# Patient Record
Sex: MALE | Race: WHITE | Employment: UNEMPLOYED | URBAN - NONMETROPOLITAN AREA
[De-identification: names, ages, dates, MRNs, and addresses within clinical notes are randomized per-mention and may not be internally consistent; named-entity substitution may affect disease eponyms.]

---

## 2021-12-08 ENCOUNTER — HOSPITAL ENCOUNTER (EMERGENCY)
Age: 36
Discharge: HOME | End: 2021-12-08
Attending: EMERGENCY MEDICINE
Payer: COMMERCIAL

## 2021-12-08 VITALS
SYSTOLIC BLOOD PRESSURE: 161 MMHG | DIASTOLIC BLOOD PRESSURE: 99 MMHG | OXYGEN SATURATION: 100 % | HEIGHT: 72 IN | HEART RATE: 84 BPM | WEIGHT: 165 LBS | BODY MASS INDEX: 22.35 KG/M2 | RESPIRATION RATE: 16 BRPM | TEMPERATURE: 98.7 F

## 2021-12-08 DIAGNOSIS — F19.20 DRUG ABUSE AND DEPENDENCE (HCC): Primary | ICD-10-CM

## 2021-12-08 PROCEDURE — 6360000002 HC RX W HCPCS: Performed by: EMERGENCY MEDICINE

## 2021-12-08 PROCEDURE — 99284 EMERGENCY DEPT VISIT MOD MDM: CPT

## 2021-12-08 PROCEDURE — 96372 THER/PROPH/DIAG INJ SC/IM: CPT

## 2021-12-08 RX ORDER — BUPROPION HYDROCHLORIDE 100 MG/1
100 TABLET ORAL 3 TIMES DAILY
COMMUNITY

## 2021-12-08 RX ORDER — KETOROLAC TROMETHAMINE 30 MG/ML
30 INJECTION, SOLUTION INTRAMUSCULAR; INTRAVENOUS ONCE
Status: COMPLETED | OUTPATIENT
Start: 2021-12-08 | End: 2021-12-08

## 2021-12-08 RX ADMIN — KETOROLAC TROMETHAMINE 30 MG: 30 INJECTION, SOLUTION INTRAMUSCULAR; INTRAVENOUS at 22:07

## 2021-12-08 ASSESSMENT — PAIN SCALES - GENERAL: PAINLEVEL_OUTOF10: 8

## 2021-12-09 NOTE — ED TRIAGE NOTES
Pt arrived via EMS, found laying in cold wet grass. Pt used meth yesterday and snorted his own medications this morning. Pt is a resident at the [facility] currently for drug use. Pt states he thinks the meth had something else in it and he does not feel right, dizzy and nauseated.

## 2021-12-09 NOTE — ED PROVIDER NOTES
Emergency Department Attending Note    Jeromy Hutchinson MD    Date of ED VIsit: 12/8/2021    CHIEF COMPLAINT  Addiction Problem      HISTORY OF PRESENT ILLNESS  Teo Gomez is a 31 y.o. male  With Vital signs of BP (!) 161/99   Pulse 84   Temp 98.7 °F (37.1 °C) (Oral)   Resp 16   Ht 6' (1.829 m)   Wt 165 lb (74.8 kg)   SpO2 100%   BMI 22.38 kg/m²  who presents to the ED with a complaint of doing drugs. Patient seen and evaluated in room 6. Patient states that he snorted some of his illicit drugs this morning and since then has been felt a little weird. He states that it was meth laced with something he is not sure why. But it sounds somewhat like hallucinogen since he is saying that it feels weird with out of body like experiences. Today he was apparently at the [facility] left there and was laying out in the middle of the field so somebody called and the ambulance and the police to take care of the patient who was then brought here for evaluation. He is not hypothermic with a core body temperature of 98.7. He is definitely high on something. But is capable of handling himself capable of walking. He is got no specific areas of pain except for some mild right rib pain. Not sure what it is from. She will be given some Toradol for that. In the area for he will be discharged to a follow-up with his primary care doctor or drug rehab center to try to straighten out. And a long conversation with him regarding the abuse of illicit drugs and how they are uncontrolled. .  No other complaints, modifying factors or associated symptoms. Patients Past medical history reviewed and listed below  Past Medical History:   Diagnosis Date    Hepatitis C     PTSD (post-traumatic stress disorder)      Past Surgical History:   Procedure Laterality Date    APPENDECTOMY      MANDIBLE SURGERY         I have reviewed the following from the nursing documentation. History reviewed. No pertinent family history.   Social History Socioeconomic History    Marital status: Not on file     Spouse name: Not on file    Number of children: Not on file    Years of education: Not on file    Highest education level: Not on file   Occupational History    Not on file   Tobacco Use    Smoking status: Current Every Day Smoker    Smokeless tobacco: Never Used   Vaping Use    Vaping Use: Every day   Substance and Sexual Activity    Alcohol use: Not Currently    Drug use: Yes     Types: Methamphetamines (Crystal Meth), Marijuana (Vergil Maricel), Opiates     Sexual activity: Not Currently   Other Topics Concern    Not on file   Social History Narrative    Not on file     Social Determinants of Health     Financial Resource Strain:     Difficulty of Paying Living Expenses: Not on file   Food Insecurity:     Worried About Running Out of Food in the Last Year: Not on file    Katia of Food in the Last Year: Not on file   Transportation Needs:     Lack of Transportation (Medical): Not on file    Lack of Transportation (Non-Medical):  Not on file   Physical Activity:     Days of Exercise per Week: Not on file    Minutes of Exercise per Session: Not on file   Stress:     Feeling of Stress : Not on file   Social Connections:     Frequency of Communication with Friends and Family: Not on file    Frequency of Social Gatherings with Friends and Family: Not on file    Attends Buddhist Services: Not on file    Active Member of [address] [place] [address] or Organizations: Not on file    Attends Club or Organization Meetings: Not on file    Marital Status: Not on file   Intimate Partner Violence:     Fear of Current or Ex-Partner: Not on file    Emotionally Abused: Not on file    Physically Abused: Not on file    Sexually Abused: Not on file   Housing Stability:     Unable to Pay for Housing in the Last Year: Not on file    Number of [place] in the Last Year: Not on file    Unstable Housing in the Last Year: Not on file     No current facility-administered medications for this encounter. Current Outpatient Medications   Medication Sig Dispense Refill    buPROPion (WELLBUTRIN) 100 MG tablet Take 100 mg by mouth 2 times daily      FLUOXETINE HCL PO Take by mouth       Allergies   Allergen Reactions    Sulfa Antibiotics Nausea And Vomiting       REVIEW OF SYSTEMS  10 systems reviewed, pertinent positives per HPI otherwise noted to be negative     PHYSICAL EXAM  BP (!) 161/99   Pulse 84   Temp 98.7 °F (37.1 °C) (Oral)   Resp 16   Ht 6' (1.829 m)   Wt 165 lb (74.8 kg)   SpO2 100%   BMI 22.38 kg/m²   GENERAL APPEARANCE: Awake and alert. Cooperative. In no obvious distress. HEAD: Normocephalic. Atraumatic. EYES: PERRL. EOM's grossly intact. Mildly dilated  ENT: Mucous membranes are pink and moist.   NECK: Supple. HEART: RRR. No murmurs. LUNGS: Respirations unlabored. CTAB. Good air exchange. ABDOMEN: Soft. Non-distended. Non-tender. No masses. No organomegaly. No guarding or rebound. EXTREMITIES: No peripheral edema. Moves all extremities equally. All extremities neurovascularly intact. SKIN: Warm and dry. No acute rashes. NEUROLOGICAL: Alert and oriented. Strength 5/5, sensation intact. Gait normal.   PSYCHIATRIC: Normal mood and affect. No HI or SI expressed to me. RADIOLOGY    If acquired see below     EKG:     If acquired see below       ED COURSE/MDM    Patient appears to be high but definitely manageable. Not sure what he abuses he states that he abused meth that he thinks was laced with something else he was requesting that we test to see what it was and I advised him that we do not necessarily do that just to find out what it was. Whenever he took should wear off within the next 6 to 7 hours. That is based on his report that he took the drugs this morning         The ED course and plan were reviewed and results discussed with the patient    The patient understood and agreed with the Discharge/transfer planning.     CLINICAL IMPRESSION and DISPOSITION    Yadira Sanchez was stable and diagnosed with drug abuse    Patient was treated with Torcynthia Marie MD  12/08/21 7665

## 2022-02-18 ENCOUNTER — HOSPITAL ENCOUNTER (EMERGENCY)
Age: 37
Discharge: HOME | End: 2022-02-18
Attending: EMERGENCY MEDICINE
Payer: MEDICARE

## 2022-02-18 VITALS
WEIGHT: 160 LBS | SYSTOLIC BLOOD PRESSURE: 114 MMHG | TEMPERATURE: 98.5 F | RESPIRATION RATE: 16 BRPM | HEIGHT: 72 IN | DIASTOLIC BLOOD PRESSURE: 70 MMHG | OXYGEN SATURATION: 98 % | HEART RATE: 60 BPM | BODY MASS INDEX: 21.67 KG/M2

## 2022-02-18 DIAGNOSIS — F32.A DEPRESSION, UNSPECIFIED DEPRESSION TYPE: Primary | ICD-10-CM

## 2022-02-18 DIAGNOSIS — F15.10 METHAMPHETAMINE ABUSE (HCC): ICD-10-CM

## 2022-02-18 LAB
ACETAMINOPHEN LEVEL: <5 UG/ML (ref 10–30)
AMPHETAMINE SCREEN, URINE: NORMAL
ANION GAP SERPL CALCULATED.3IONS-SCNC: 8 MMOL/L (ref 3–16)
BARBITURATE SCREEN URINE: NORMAL
BASOPHILS ABSOLUTE: 0 K/UL (ref 0–0.2)
BASOPHILS RELATIVE PERCENT: 0.3 %
BENZODIAZEPINE SCREEN, URINE: NORMAL
BUN BLDV-MCNC: 13 MG/DL (ref 7–20)
CALCIUM SERPL-MCNC: 9.5 MG/DL (ref 8.3–10.6)
CANNABINOID SCREEN URINE: NORMAL
CHLORIDE BLD-SCNC: 101 MMOL/L (ref 99–110)
CO2: 32 MMOL/L (ref 21–32)
COCAINE METABOLITE SCREEN URINE: NORMAL
CREAT SERPL-MCNC: 0.8 MG/DL (ref 0.9–1.3)
EOSINOPHILS ABSOLUTE: 0.2 K/UL (ref 0–0.6)
EOSINOPHILS RELATIVE PERCENT: 3.8 %
ETHANOL: NORMAL MG/DL (ref 0–0.08)
GFR AFRICAN AMERICAN: >60
GFR NON-AFRICAN AMERICAN: >60
GLUCOSE BLD-MCNC: 84 MG/DL (ref 70–99)
HCT VFR BLD CALC: 44.8 % (ref 40.5–52.5)
HEMOGLOBIN: 15.2 G/DL (ref 13.5–17.5)
LYMPHOCYTES ABSOLUTE: 1.7 K/UL (ref 1–5.1)
LYMPHOCYTES RELATIVE PERCENT: 27.6 %
Lab: NORMAL
MCH RBC QN AUTO: 31.4 PG (ref 26–34)
MCHC RBC AUTO-ENTMCNC: 33.9 G/DL (ref 31–36)
MCV RBC AUTO: 92.5 FL (ref 80–100)
METHADONE SCREEN, URINE: NORMAL
MONOCYTES ABSOLUTE: 0.6 K/UL (ref 0–1.3)
MONOCYTES RELATIVE PERCENT: 9.6 %
NEUTROPHILS ABSOLUTE: 3.7 K/UL (ref 1.7–7.7)
NEUTROPHILS RELATIVE PERCENT: 58.7 %
OPIATE SCREEN URINE: NORMAL
OXYCODONE URINE: NORMAL
PDW BLD-RTO: 13.4 % (ref 12.4–15.4)
PH UA: 7
PHENCYCLIDINE SCREEN URINE: NORMAL
PLATELET # BLD: 224 K/UL (ref 135–450)
PMV BLD AUTO: 9.1 FL (ref 5–10.5)
POTASSIUM REFLEX MAGNESIUM: 3.9 MMOL/L (ref 3.5–5.1)
PROPOXYPHENE SCREEN: NORMAL
RBC # BLD: 4.84 M/UL (ref 4.2–5.9)
SALICYLATE, SERUM: <0.3 MG/DL (ref 15–30)
SODIUM BLD-SCNC: 141 MMOL/L (ref 136–145)
WBC # BLD: 6.3 K/UL (ref 4–11)

## 2022-02-18 PROCEDURE — 80307 DRUG TEST PRSMV CHEM ANLYZR: CPT

## 2022-02-18 PROCEDURE — 85025 COMPLETE CBC W/AUTO DIFF WBC: CPT

## 2022-02-18 PROCEDURE — 80048 BASIC METABOLIC PNL TOTAL CA: CPT

## 2022-02-18 PROCEDURE — 80143 DRUG ASSAY ACETAMINOPHEN: CPT

## 2022-02-18 PROCEDURE — 80179 DRUG ASSAY SALICYLATE: CPT

## 2022-02-18 PROCEDURE — 36415 COLL VENOUS BLD VENIPUNCTURE: CPT

## 2022-02-18 PROCEDURE — 99283 EMERGENCY DEPT VISIT LOW MDM: CPT

## 2022-02-18 PROCEDURE — 82077 ASSAY SPEC XCP UR&BREATH IA: CPT

## 2022-02-18 RX ORDER — HYDROXYZINE HYDROCHLORIDE 25 MG/1
25 TABLET, FILM COATED ORAL EVERY 8 HOURS PRN
COMMUNITY

## 2022-02-18 NOTE — ED NOTES
Patient brought back from triage. Patient changed into safety clothing and belongings locked into locker. Patient oriented to [facility]. Will continue to monitor patient.       John Encarnacion RN  02/18/22 0172

## 2022-02-18 NOTE — ED NOTES
Pt has been explained d/c instructions and verbalized understanding. Pt escorted out of [facility], by Regla Richmond RN, to wait in lobby for ride.      YANCY Ruano  02/18/22 0564

## 2022-02-18 NOTE — ED NOTES
Level of Care Disposition: Discharge     Patient was seen by ED provider and [facility] staff. The case was presented to psychiatric provider on-call .   Based on the ED evaluation and information presented to the provider by [facility] staff, it is the recommendation of the on call psychiatric provider that the patient be discharged from a psychiatric standpoint with the following referrals: follow up with the [place], [place]  02/18/22 4510

## 2022-02-18 NOTE — ED NOTES
Pt states that he reported to the [facility] that he was having thoughts of hurting himself. He denies a plan or a method and has had no previous attempts. He does admit to cutting many years ago. He states that he came to the hospital today so he could be prescribed Wellbutrin. Apparently, this is the only anti-depressant that works for him and the [facility] does not prescribe it. He told this writer that the nurse practitioner at the [facility] prescribed him Lexapro and he is open to trying that. This is not the first time the pt has been to the [facility]. He was there one other time before but left, violating his parole, causing him to go back to [facility] for a month. He reports that he has been to rehab centers before but the programs were not affective for him because he did not want to get clean at the time. He states that the plan after graduation from the [facility] is to stay with a friend for awhile and avoid the people who he used to associate with. Pt is agreeable to be d/c back to the [facility].       YANCY Gonzalez  02/18/22 1386

## 2022-02-18 NOTE — ED NOTES
Collateral Contact:  Name: Linette Dixon  Phone: [phone]  Relation to Patient: employee at [employer] with Patient: Today    Linette Dixon states that the pt has been with the [facility] since 2/11/22. Apparently, the pt disclosed to his main counselor that he has been having thoughts of wanting to hurt himself. Linette Dixon did not report any plan or method. According to the records they have on file the pt has had psychiatric admissions in the past. Pt was at [facility] a month ago. Since he has been at the [facility] 38 reports that the pt has been been \"skitish\" and \"isolative\".       Yolis Cortes, W  02/18/22 6322

## 2022-02-18 NOTE — ED PROVIDER NOTES
CHIEF COMPLAINT  Psychiatric Evaluation (having negative thoughts. pt states he has been off his medication wellbutrin since the 11 th d/t being in the [facility] program. Pt deneis any drug or etoh use since the 10 th)      HISTORY OF PRESENT ILLNESS  Cyndee Campbell is a 31 y.o. male with a history of PTSD, substance use who presents emerged department for evaluation of psychiatric evaluation. Patient states that he has been in the [facility] program. He states that he has recently used methamphetamine. He denies any alcohol or drug use since February 10. He states over the past several days he has had negative thoughts, depression. He states that he does have fleeting thoughts of self-harm however he does not have any plan of how to do this. He states that he used to be on Wellbutrin however since he is not able to access any medicines at his facility currently, he has been off of this. He denies any homicidal ideation. He denies any medical complaints at this time. Past Medical History:   Diagnosis Date    Hepatitis C     PTSD (post-traumatic stress disorder)      Past Surgical History:   Procedure Laterality Date    APPENDECTOMY      MANDIBLE SURGERY       History reviewed. No pertinent family history.   Social History     Socioeconomic History    Marital status: Single     Spouse name: Not on file    Number of children: Not on file    Years of education: Not on file    Highest education level: Not on file   Occupational History    Not on file   Tobacco Use    Smoking status: Current Every Day Smoker    Smokeless tobacco: Never Used   Vaping Use    Vaping Use: Every day   Substance and Sexual Activity    Alcohol use: Not Currently    Drug use: Yes     Types: Methamphetamines (Crystal Meth), Marijuana (Cindy Marin), Opiates     Sexual activity: Not Currently   Other Topics Concern    Not on file   Social History Narrative    Not on file     Social Determinants of Health     Financial Resource Strain:     Atraumatic. [place] HEART: RRR. No harsh murmurs. Intact radial pulses 2+ bilaterally. LUNGS: Respirations unlabored without accessory muscle use. Speaking comfortably in full sentences. ABDOMEN: Soft. Non-distended. Non-tender. No guarding or rebound. EXTREMITIES: No peripheral edema. No acute deformities. SKIN: Warm and dry. No acute rashes. NEUROLOGICAL: Alert and oriented X 3. No focal deficits    LABS  I have reviewed all labs for this visit.    Results for orders placed or performed during the hospital encounter of 02/18/22   CBC with Auto Differential   Result Value Ref Range    WBC 6.3 4.0 - 11.0 K/uL    RBC 4.84 4.20 - 5.90 M/uL    Hemoglobin 15.2 13.5 - 17.5 g/dL    Hematocrit 44.8 40.5 - 52.5 %    MCV 92.5 80.0 - 100.0 fL    MCH 31.4 26.0 - 34.0 pg    MCHC 33.9 31.0 - 36.0 g/dL    RDW 13.4 12.4 - 15.4 %    Platelets 167 950 - 403 K/uL    MPV 9.1 5.0 - 10.5 fL    Neutrophils % 58.7 %    Lymphocytes % 27.6 %    Monocytes % 9.6 %    Eosinophils % 3.8 %    Basophils % 0.3 %    Neutrophils Absolute 3.7 1.7 - 7.7 K/uL    Lymphocytes Absolute 1.7 1.0 - 5.1 K/uL    Monocytes Absolute 0.6 0.0 - 1.3 K/uL    Eosinophils Absolute 0.2 0.0 - 0.6 K/uL    Basophils Absolute 0.0 0.0 - 0.2 K/uL   Basic Metabolic Panel w/ Reflex to MG   Result Value Ref Range    Sodium 141 136 - 145 mmol/L    Potassium reflex Magnesium 3.9 3.5 - 5.1 mmol/L    Chloride 101 99 - 110 mmol/L    CO2 32 21 - 32 mmol/L    Anion Gap 8 3 - 16    Glucose 84 70 - 99 mg/dL    BUN 13 7 - 20 mg/dL    CREATININE 0.8 (L) 0.9 - 1.3 mg/dL    GFR Non-African American >60 >60    GFR African American >60 >60    Calcium 9.5 8.3 - 10.6 mg/dL   Acetaminophen Level   Result Value Ref Range    Acetaminophen Level <5 (L) 10 - 30 ug/mL   Salicylate   Result Value Ref Range    Salicylate, Serum <0.0 (L) 15.0 - 30.0 mg/dL   Drug screen multi urine   Result Value Ref Range    Amphetamine Screen, Urine Neg Negative <1000ng/mL    Barbiturate Screen, Ur Neg Negative <200 ng/mL    Benzodiazepine Screen, Urine Neg Negative <200 ng/mL    Cannabinoid Scrn, Ur Neg Negative <50 ng/mL    Cocaine Metabolite Screen, Urine Neg Negative <300 ng/mL    Opiate Scrn, Ur Neg Negative <300 ng/mL    PCP Screen, Urine Neg Negative <25 ng/mL    Methadone Screen, Urine Neg Negative <300 ng/mL    Propoxyphene Scrn, Ur Neg Negative <300 ng/mL    Oxycodone Urine Neg Negative <100 ng/ml    pH, UA 7.0     Drug Screen Comment: see below    Ethanol   Result Value Ref Range    Ethanol Lvl None Detected mg/dL         RADIOLOGY  X-RAYS:  I have reviewed radiologic plain film image(s). ALL OTHER NON-PLAIN FILM IMAGES SUCH AS CT, ULTRASOUND AND MRI HAVE BEEN READ BY THE RADIOLOGIST. No orders to display          ED COURSE/MDM  Patient seen and evaluated. Old records reviewed. Labs and imaging reviewed and results discussed with patient. 44-year-old male presenting for psychiatric evaluation, depression. He has no overt suicidal ideation or plan at this time. He would like a medication adjustment. He has no medical complaints. He arrives with stable vital signs. Evaluation included laboratory evaluation, toxicology screen, psychiatric evaluation. Laboratory evaluation was unremarkable. Patient was evaluated by psychiatric staff. He was cleared by psychiatric staff to go home with outpatient follow-up. The patient will be discharged from the emergency department. The patient was counseled on their diagnosis and any medications prescribed. They were advised on the need for PCP followup. They were counseled on the need to return to the emergency department if any of their symptoms were to worsen, change or have any other concerns. Discharged in stable condition. Patient was given scripts for the following medications. I counseled patient how to take these medications. Discharge Medication List as of 2/18/2022  5:30 PM          CLINICAL IMPRESSION  1. Depression, unspecified depression type    2. Methamphetamine abuse (HCC)        Blood pressure 114/70, pulse 60, temperature 98.5 °F (36.9 °C), temperature source Temporal, resp. rate 16, height 6' (1.829 m), weight 160 lb (72.6 kg), SpO2 98 %. DISPOSITION  Seth Ball was discharged to home in stable condition. This chart was generated in part by using Dragon Dictation system and may contain errors related to that system including errors in grammar, punctuation, and spelling, as well as words and phrases that may be inappropriate. If there are any questions or concerns please feel free to contact the dictating provider for clarification.      Pascale Lara MD  02/21/22 2489

## 2025-03-21 ENCOUNTER — HOSPITAL ENCOUNTER (EMERGENCY)
Age: 40
Discharge: HOME | End: 2025-03-21
Attending: STUDENT IN AN ORGANIZED HEALTH CARE EDUCATION/TRAINING PROGRAM
Payer: MEDICARE

## 2025-03-21 VITALS
TEMPERATURE: 98.1 F | RESPIRATION RATE: 15 BRPM | DIASTOLIC BLOOD PRESSURE: 72 MMHG | HEART RATE: 69 BPM | SYSTOLIC BLOOD PRESSURE: 124 MMHG | OXYGEN SATURATION: 97 %

## 2025-03-21 DIAGNOSIS — T40.601A OPIATE OVERDOSE, ACCIDENTAL OR UNINTENTIONAL, INITIAL ENCOUNTER (HCC): Primary | ICD-10-CM

## 2025-03-21 LAB — GLUCOSE BLD-MCNC: 87 MG/DL (ref 75–110)

## 2025-03-21 PROCEDURE — 82947 ASSAY GLUCOSE BLOOD QUANT: CPT

## 2025-03-21 PROCEDURE — 6370000000 HC RX 637 (ALT 250 FOR IP)

## 2025-03-21 PROCEDURE — 93005 ELECTROCARDIOGRAM TRACING: CPT

## 2025-03-21 PROCEDURE — 99283 EMERGENCY DEPT VISIT LOW MDM: CPT

## 2025-03-21 ASSESSMENT — PAIN - FUNCTIONAL ASSESSMENT: PAIN_FUNCTIONAL_ASSESSMENT: NONE - DENIES PAIN

## 2025-03-21 ASSESSMENT — ENCOUNTER SYMPTOMS
SHORTNESS OF BREATH: 0
ABDOMINAL PAIN: 0

## 2025-03-21 NOTE — ED PROVIDER NOTES
Note Started: 4:14 PM EDT     Faculty Sign-Out Attestation  Handoff taken on the following patient from prior Attending Physician at 1600: Sulaiman    I was available and discussed any additional care issues that arose and coordinated the management plans with the resident(s) caring for the patient during my duty period. Any areas of disagreement with resident’s documentation of care or procedures are noted on the chart. I was personally present for the key portions of any/all procedures during my duty period. I have documented in the chart those procedures where I was not present during the key portions.    Unintentional overdose requiring 8 mg Narcan prior to arrival.  Now awake and alert.  Mild hypotension.  Plan to monitor, if no significant decompensation or change plan to discharge with Narcan home kit and fentanyl test strips    Nuria Pelletier MD  Attending Physician        Nuria Pelletier MD  03/21/25 9869

## 2025-03-21 NOTE — ED PROVIDER NOTES
[facility]     Emergency Department     Faculty Attestation    I performed a history and physical examination of the patient and discussed management with the resident. I have reviewed and agree with the resident’s findings including all diagnostic interpretations, and treatment plans as written at the time of my review. Any areas of disagreement are noted on the chart. I was personally present for the key portions of any procedures. I have documented in the chart those procedures where I was not present during the key portions. For Physician Assistant/ Nurse Practitioner cases/documentation I have personally evaluated this patient and have completed at least one if not all key elements of the E/M (history, physical exam, and MDM). Additional findings are as noted.    PtName: Erick Menard  MRN: [id number]  Birthdate 1985  Date of evaluation: 3/21/25  Note Started: 3:36 PM EDT    Primary Care Physician: No primary care provider on file.    Brief HPI:  Patient is a 39-year-old male who presents to the emergency department with drug overdose.  The patient reports that he smoked an unknown substance then had loss of consciousness.  EMS was called the patient was given Narcan 8 mg intranasally.  The patient had recovery of his unconsciousness.  He is currently alert and oriented x 4.    Pertinent Physical Exam Findings:  Vitals:    03/21/25 1512   BP:    Pulse: 69   Resp: 15   Temp: 98.1 °F (36.7 °C)   SpO2:    Appears well, alert and oriented x 4, GCS is 15    Medical Decision Making: Patient is a 39 y.o. male presenting to the emergency department with loss of consciousness after using drugs. The chart was reviewed for pertinent history relating to the chief complaint.  The patient appears well at this time in no acute distress, vitals are stable.  He has a GCS of 15.  He reports becoming unconscious after using drugs.  The patient syndrome was reversed by Narcan.  We

## 2025-03-21 NOTE — DISCHARGE INSTRUCTIONS
You are seen for concerns of overdose.  You are provided Narcan and drug test strips during her stay.  Return to the emergency department if you experience fevers, chills, trouble breathing, numbness, weakness, feeling of unwell, any other concern.

## 2025-03-21 NOTE — ED PROVIDER NOTES
[facility] EMERGENCY DEPARTMENT  Emergency Department Encounter  Emergency Medicine Resident     Pt Name:Erick Menard  MRN: [id number]  Birthdate 1985  Date of evaluation: 3/21/25  PCP:  No primary care provider on file.  Note Started: 3:39 PM EDT      CHIEF COMPLAINT       Chief Complaint   Patient presents with    Drug Overdose       HISTORY OF PRESENT ILLNESS  (Location/Symptom, Timing/Onset, Context/Setting, Quality, Duration, Modifying Factors, Severity.)      Erick Menard is a 39 y.o. male who presents brought in by EMS after being found down at his group home.  Patient responded to 8 mg of Narcan patient reports he smoked a drug that he thought was K2.  Does not regularly use opiates, meth is his drug of choice, has not used meth today.  Denies ill feeling prior to using the drug.    PAST MEDICAL / SURGICAL / SOCIAL / FAMILY HISTORY      has a past medical history of Hepatitis C and PTSD (post-traumatic stress disorder).     has a past surgical history that includes Appendectomy and Mandible surgery.    Social History     Socioeconomic History    Marital status: Single     Spouse name: Not on file    Number of children: Not on file    Years of education: Not on file    Highest education level: Not on file   Occupational History    Not on file   Tobacco Use    Smoking status: Every Day    Smokeless tobacco: Never   Vaping Use    Vaping status: Every Day   Substance and Sexual Activity    Alcohol use: Not Currently    Drug use: Yes     Types: Methamphetamines (Crystal Meth), Marijuana (Weed), Opiates     Sexual activity: Not Currently   Other Topics Concern    Not on file   Social History Narrative    Not on file     Social Drivers of Health     Financial Resource Strain: Not on file   Food Insecurity: Not on file   Transportation Needs: Not on file   Physical Activity: Not on file   Stress: Not on file   Social Connections: Not on file   Intimate Partner Violence: Not on file   Housing  04:55:50 PM   DISPOSITION CONDITION Stable           PATIENT REFERRED TO:  [facility] AT [facility]  [address] [address] [phone]        [facility] Emergency Department  [address] [address] [phone]    As needed, If symptoms worsen      DISCHARGE MEDICATIONS:  Discharge Medication List as of 3/21/2025  4:56 PM          Becky Garcia DO  Emergency Medicine Resident    (Please note that portions of thisnote were completed with a voice recognition program.  Efforts were made to edit the dictations but occasionally words are mis-transcribed.)

## 2025-03-22 LAB
EKG ATRIAL RATE: 53 BPM
EKG P-R INTERVAL: 140 MS
EKG Q-T INTERVAL: 430 MS
EKG QRS DURATION: 88 MS
EKG QTC CALCULATION (BAZETT): 403 MS
EKG R AXIS: 13 DEGREES
EKG T AXIS: 112 DEGREES
EKG VENTRICULAR RATE: 53 BPM

## 2025-03-22 PROCEDURE — 93010 ELECTROCARDIOGRAM REPORT: CPT | Performed by: INTERNAL MEDICINE
